# Patient Record
Sex: MALE | Race: WHITE | NOT HISPANIC OR LATINO | URBAN - METROPOLITAN AREA
[De-identification: names, ages, dates, MRNs, and addresses within clinical notes are randomized per-mention and may not be internally consistent; named-entity substitution may affect disease eponyms.]

---

## 2021-02-13 DIAGNOSIS — Z23 ENCOUNTER FOR IMMUNIZATION: ICD-10-CM

## 2021-02-15 ENCOUNTER — IMMUNIZATIONS (OUTPATIENT)
Dept: FAMILY MEDICINE CLINIC | Facility: HOSPITAL | Age: 66
End: 2021-02-15

## 2021-02-15 DIAGNOSIS — Z23 ENCOUNTER FOR IMMUNIZATION: Primary | ICD-10-CM

## 2021-02-15 PROCEDURE — 0001A SARS-COV-2 / COVID-19 MRNA VACCINE (PFIZER-BIONTECH) 30 MCG: CPT | Performed by: PHYSICIAN ASSISTANT

## 2021-02-15 PROCEDURE — 91300 SARS-COV-2 / COVID-19 MRNA VACCINE (PFIZER-BIONTECH) 30 MCG: CPT | Performed by: PHYSICIAN ASSISTANT

## 2021-03-08 ENCOUNTER — IMMUNIZATIONS (OUTPATIENT)
Dept: FAMILY MEDICINE CLINIC | Facility: HOSPITAL | Age: 66
End: 2021-03-08

## 2021-03-08 DIAGNOSIS — Z23 ENCOUNTER FOR IMMUNIZATION: Primary | ICD-10-CM

## 2021-03-08 PROCEDURE — 91300 SARS-COV-2 / COVID-19 MRNA VACCINE (PFIZER-BIONTECH) 30 MCG: CPT

## 2021-03-08 PROCEDURE — 0002A SARS-COV-2 / COVID-19 MRNA VACCINE (PFIZER-BIONTECH) 30 MCG: CPT

## 2023-03-29 VITALS
WEIGHT: 187 LBS | HEART RATE: 71 BPM | SYSTOLIC BLOOD PRESSURE: 136 MMHG | DIASTOLIC BLOOD PRESSURE: 77 MMHG | BODY MASS INDEX: 30.05 KG/M2 | HEIGHT: 66 IN

## 2023-03-29 DIAGNOSIS — M25.511 ACUTE PAIN OF RIGHT SHOULDER DUE TO TRAUMA: Primary | ICD-10-CM

## 2023-03-29 DIAGNOSIS — G89.11 ACUTE PAIN OF RIGHT SHOULDER DUE TO TRAUMA: Primary | ICD-10-CM

## 2023-03-29 RX ORDER — VALSARTAN 80 MG/1
80 TABLET ORAL DAILY
COMMUNITY
Start: 2023-02-17 | End: 2024-02-17

## 2023-03-29 RX ORDER — AMPICILLIN TRIHYDRATE 250 MG
1000 CAPSULE ORAL DAILY
COMMUNITY

## 2023-03-29 RX ORDER — ROSUVASTATIN CALCIUM 20 MG/1
TABLET, COATED ORAL
COMMUNITY
Start: 2005-04-13

## 2023-03-29 RX ORDER — MILK THISTLE 150 MG
1 CAPSULE ORAL DAILY
COMMUNITY

## 2023-03-29 RX ORDER — ROSUVASTATIN CALCIUM 20 MG/1
TABLET, COATED ORAL
COMMUNITY
Start: 2023-02-06

## 2023-03-29 RX ORDER — LANOLIN ALCOHOL/MO/W.PET/CERES
1 CREAM (GRAM) TOPICAL 2 TIMES DAILY
COMMUNITY

## 2023-03-29 RX ORDER — METOPROLOL SUCCINATE 50 MG/1
TABLET, EXTENDED RELEASE ORAL
COMMUNITY
Start: 2005-04-13

## 2023-03-29 NOTE — PROGRESS NOTES
"Orthopaedic Surgery - Office Note  Manuel Dow (89 y o  male)   : 1955   MRN: [id number]  Encounter Date: 3/29/2023    Chief Complaint   Patient presents with   • Right Shoulder - Pain       Assessment / Plan  Acute right shoulder pain- rotator cuff injury suspected     · Activity as tolerated  · Begin outpatient PT for rotator cuff strengthening  · Anti-inflammatories or Tylenol prn pain   · If patient does not see success with conservative treatments a MRI of the right shoulder can be obtained to further evaluate the integrity of the rotator cuff muscle  Return in about 6 weeks (around 5/10/2023) for w/ Dr Sravan Navarrete  History of Present Illness  Manuel Dow is a 69 y o  male who presents for an evaluation of the right shoulder  The pain began 7 month(s) ago and is associated with an acute injury  Patient was throwing a ball to his dog and felt immediate pain anterior shoulder with radiating pain down his bicep  The patient describes the pain as aching and sharp in intensity,  intermittent in timing, and localizes the pain to the  right subacromial joint  The pain is worse with overhead work, raising arm over head and reaching behind his back and lifting away from his body  and relieved by rest, avoiding the painful activities  The pain is not associated with numbness and tingling  The patient is awoken at night by the pain  The patient has not had any previous treatment for the right shoulder    Review of Systems  Pertinent items are noted in HPI  All other systems were reviewed and are negative  Physical Exam  /77 (BP Location: Left arm, Patient Position: Sitting, Cuff Size: Adult)   Pulse 71   Ht 5' 6\" (1 676 m) Comment: verbal  Wt 84 8 kg (187 lb)   BMI 30 18 kg/m²   Cons: Appears well  No apparent distress  Psych: Alert  Oriented x3  Mood and affect normal   Eyes: PERRLA, EOMI  Resp: Normal effort  No audible wheezing or stridor  CV: Palpable pulse    No discernable " arrhythmia  No LE edema  Lymph:  No palpable cervical, axillary, or inguinal lymphadenopathy  Skin: Warm  No palpable masses  No visible lesions  Neuro: Normal muscle tone  Normal and symmetric DTR's  Right Shoulder Exam  Alignment / Posture:  Normal shoulder posture  Inspection:  No swelling  No edema  No erythema  No ecchymosis  Palpation:  No tenderness  ROM:  Shoulder   Shoulder ER 60  Shoulder IR T8  Strength:  Subscapularis 5/5  Elevation 5/5, external -4/5   Stability:  No objective shoulder instability  Tests: (+) Walton  (+) Janas Smoke  Neurovascular:  Sensation intact in Ax/R/M/U nerve distributions  2+ radial pulse  Studies Reviewed  No studies to review    Procedures  No procedures today  Medical, Surgical, Family, and Social History  The patient's medical history, family history, and social history, were reviewed and updated as appropriate  History reviewed  No pertinent past medical history  History reviewed  No pertinent surgical history  History reviewed  No pertinent family history      Social History     Occupational History   • Not on file   Tobacco Use   • Smoking status: Never   • Smokeless tobacco: Never   Substance and Sexual Activity   • Alcohol use: Not on file   • Drug use: Not on file   • Sexual activity: Not on file       Not on File      Current Outpatient Medications:   •  Cholecalciferol 50 MCG (2000 UT) CAPS, Take 5,000 capsules by mouth, Disp: , Rfl:   •  Cinnamon 500 MG capsule, Take 1,000 mg by mouth daily, Disp: , Rfl:   •  glucosamine-chondroitin 500-400 MG tablet, Take 1 tablet by mouth 2 (two) times a day As Needed, Disp: , Rfl:   •  metoprolol succinate (TOPROL-XL) 50 mg 24 hr tablet, , Disp: , Rfl:   •  Milk Thistle 150 MG CAPS, Take 1 tablet by mouth daily, Disp: , Rfl:   •  rosuvastatin (CRESTOR) 20 MG tablet, , Disp: , Rfl:   •  Turmeric 1053 MG TABS, Use, Disp: , Rfl:   •  valsartan (DIOVAN) 80 mg tablet, Take 80 mg by mouth daily, Disp: , Rfl:   •  rosuvastatin (CRESTOR) 20 MG tablet, TAKE ONE TABLET BY MOUTH EVERY DAY - PATIENT NEEDS FOLLOW-UP WITH LABS, Disp: , Rfl:       Farhat Azul    Scribe Attestation    I,:  Farhat Azul am acting as a scribe while in the presence of the attending physician :       I,:  Shankar Ramachandran MD personally performed the services described in this documentation    as scribed in my presence :

## 2023-04-03 ENCOUNTER — EVALUATION (OUTPATIENT)
Dept: PHYSICAL THERAPY | Facility: CLINIC | Age: 68
End: 2023-04-03

## 2023-04-03 DIAGNOSIS — M25.511 ACUTE PAIN OF RIGHT SHOULDER DUE TO TRAUMA: Primary | ICD-10-CM

## 2023-04-03 DIAGNOSIS — G89.11 ACUTE PAIN OF RIGHT SHOULDER DUE TO TRAUMA: Primary | ICD-10-CM

## 2023-04-03 NOTE — PROGRESS NOTES
PT EVALUATION    Today's date: 23  Patient name: Clemente Salinas  : 1955  MRN: [id number]  Referring provider: Chapito Aguilera, *  Dx:   1  Acute pain of right shoulder due to trauma        Clemente Salinas is a 69 y o  male who presents with right shoulder pain following a throwing movement >6 months ago  Symptoms tend to be anterior and down the biceps  There is tightness with internal rotation and pain with resisted ER rotation  This patient would benefit from skilled physical therapy to address their listed impairments and functional limitations to maximize functional outcome  Impairments:    restricted ROM    decreased strength   pain with function   activity intolerance   Postural dysfunction     Prognosis:  Excellent  Positive and negative prognostic indicator(s):  pain >3 months    Goals:    STG Patient is independent with HEP   STG Range of motion is improved by 25% in 2 weeks  LTG Range of motion is improved by 50% in 4 weeks  LTG Increase strength full grade in 4 weeks     Planned interventions:  home exercise program, patient education, manual therapy, graded activity, flexibility and functional range of motion exercises    Duration in visits:  8  Frequency: 2 visits per week  Duration in weeks:  4-6    History of Current Injury:  6+ months ago, he had onset right shoulder pain about an hour after throwing a ball to his dog  Symptoms were anterior and into the biceps  He wakes at night due to symptoms depending on his arm position (demonstrates behind the head)  Denies prior history of left shoulder injury  He doesn't usually have to lift at work but had increased pain when working overhead using a drill recently        Pain location: anterior shoulder  Pain descriptors:  sharp  Pain intensity:  7/10, 9/08 at rest in certain positions (arm supported)    Aggravating factors: overhead activities, reaching behind the back, lifting away from body  Easing factors: avoiding above      Patient goals:  decreased pain, increased mobility and increased strength    Objective     Active Range of Motion   Left Shoulder   Flexion: 162 degrees   Extension: 50 degrees   Abduction: 162 degrees   Internal rotation BTB: T10     Right Shoulder   Flexion: 158 degrees   Extension: 50 degrees   Abduction: 159 degrees   Internal rotation BTB: T10     Strength/Myotome Testing     Left Shoulder     Planes of Motion   Flexion: 4+   Extension: 4   External rotation at 0°: 4   Internal rotation at 0°: 5     Additional Strength Details  Pain with resisted ER    General Comments:      Shoulder Comments   Type 1 SICK scapula R>L  Protracted scapulae          Precautions: none      Manuals 4/3            Right scap ROM SY            IR stretching SY            pec minor release                          Neuro Re-Ed                                                                                                        Ther Ex             Thoracic ext over chair :10x5            Doorway uni  Stretch? Avoid ant pain :10x5            rows Red TB :10x5            ext             ER Red TB :10x5            Cross body stretch against wall :10x5            Sleeper stretch?             pball roll up wall?              Progressive RTC strengthening                                                                 Ther Activity                                       Gait Training                                       Modalities

## 2023-04-11 ENCOUNTER — APPOINTMENT (OUTPATIENT)
Dept: PHYSICAL THERAPY | Facility: CLINIC | Age: 68
End: 2023-04-11

## 2023-04-17 ENCOUNTER — APPOINTMENT (OUTPATIENT)
Dept: PHYSICAL THERAPY | Facility: CLINIC | Age: 68
End: 2023-04-17

## 2023-05-01 ENCOUNTER — OFFICE VISIT (OUTPATIENT)
Dept: PHYSICAL THERAPY | Facility: CLINIC | Age: 68
End: 2023-05-01

## 2023-05-01 DIAGNOSIS — M25.511 ACUTE PAIN OF RIGHT SHOULDER DUE TO TRAUMA: Primary | ICD-10-CM

## 2023-05-01 DIAGNOSIS — G89.11 ACUTE PAIN OF RIGHT SHOULDER DUE TO TRAUMA: Primary | ICD-10-CM

## 2023-05-01 NOTE — PROGRESS NOTES
Daily Note     Today's date: 2023  Patient name: Liliane Morrison  : 1955  MRN: [id number]  Referring provider: Justin Holliday, *  Dx:   Encounter Diagnosis     ICD-10-CM    1  Acute pain of right shoulder due to trauma  M25 511     G89 11                      Subjective: notes he might be getting a little better but he continues to have increased pain with lifting heavier objects, which he has a hard time avoiding      Objective: See treatment diary below      Assessment: Tolerated treatment well  Patient exhibited good technique with therapeutic exercises and would benefit from continued PT  Reviewed form on band exercises, looking for increased scapular retraction       Plan: Progress treatment as tolerated  Follow-up with Dr Ekta Hankins on 5/10/23     Precautions: none      Manuals 4/3 4/17 5/1          Right scap ROM SY SY SY          IR stretching SY SY SY          pec minor release                          Neuro Re-Ed                                                                                                        Ther Ex             Thoracic ext over chair :10x5 :10x5 :10x5          Doorway uni  Stretch? Avoid ant pain :10x5 :10x5 :10x5          rows Red TB :10x5 Red TB :05x15 Green TB :05x20          ext             ER Red TB :10x5 Red TB :05x15 Red TB :05x20          Cross body stretch against wall :10x5 D/c           Sleeper stretch?  :10x5 sidelying :10x5 sidelying          pball roll up wall?   Wall slides 15           Progressive RTC strengthening             TB ext  Green :05x15 Green :05x20          scap press downs  :05x10 :05x15                                    Ther Activity                                       Gait Training                                       Modalities

## 2023-05-10 VITALS
DIASTOLIC BLOOD PRESSURE: 88 MMHG | BODY MASS INDEX: 30.05 KG/M2 | WEIGHT: 187 LBS | SYSTOLIC BLOOD PRESSURE: 155 MMHG | HEIGHT: 66 IN | HEART RATE: 65 BPM

## 2023-05-10 DIAGNOSIS — M25.511 ACUTE PAIN OF RIGHT SHOULDER DUE TO TRAUMA: Primary | ICD-10-CM

## 2023-05-10 DIAGNOSIS — G89.11 ACUTE PAIN OF RIGHT SHOULDER DUE TO TRAUMA: Primary | ICD-10-CM

## 2023-05-10 RX ORDER — ASPIRIN 81 MG/1
81 TABLET ORAL DAILY
COMMUNITY
Start: 2023-04-27 | End: 2024-04-26

## 2023-05-10 NOTE — PROGRESS NOTES
"Orthopaedic Surgery - Office Note  Samuel Blas (73 y o  male)   : 1955   MRN: [id number]  Encounter Date: 5/10/2023    Chief Complaint   Patient presents with   • Right Shoulder - Follow-up       Assessment / Plan  Acute right shoulder pain- rotator cuff injury suspected     · Patient is electing to continue with PT, new referral given today   · If he fails to improve, we will order an MRI to further evaluate the integrity of the rotator cuff   · Activity as tolerated  · Reviewed recent PT notes  · Ice, heat and anti-inflammatories prn   Return in about 2 months (around 7/10/2023) for follow up w/ Dr Destiny Mercado  History of Present Illness  Samuel Blas is a 69 y o  male who presents for follow up Acute right shoulder pain- rotator cuff injury suspected  The pain began 2022 and is associated with an acute injury  Patient was throwing a ball to his dog and felt immediate pain anterior shoulder with radiating pain down his bicep  Since his prior visit, he has been attending PT which he feels has significantly helped reduce his symptoms  However, he notes continues pain with heavy overhead lifting  He is using his arm as tolerated  He states his pain at night has resolved  He denies any radiating symptoms  Review of Systems  Pertinent items are noted in HPI  All other systems were reviewed and are negative  Physical Exam  /88   Pulse 65   Ht 5' 6\" (1 676 m)   Wt 84 8 kg (187 lb)   BMI 30 18 kg/m²   Cons: Appears well  No apparent distress  Psych: Alert  Oriented x3  Mood and affect normal   Eyes: PERRLA, EOMI  Resp: Normal effort  No audible wheezing or stridor  CV: Palpable pulse  No discernable arrhythmia  No LE edema  Lymph:  No palpable cervical, axillary, or inguinal lymphadenopathy  Skin: Warm  No palpable masses  No visible lesions  Neuro: Normal muscle tone  Normal and symmetric DTR's       Right Shoulder Exam  Alignment / Posture:  Normal shoulder " posture  Inspection:  No swelling  No ecchymosis  Palpation:  No tenderness  No effusion  ROM:  Shoulder   Shoulder ER 60  Shoulder IR T8  Strength:  Supraspinatus 5/5  Infraspinatus 4+/5  Subscapularis 5/5  Stability:  No objective shoulder instability  Tests: (+) Walton  Neurovascular:  Sensation intact in Ax/R/M/U nerve distributions  2+ radial pulse  Studies Reviewed  No studies to review    Procedures  No procedures today  Medical, Surgical, Family, and Social History  The patient's medical history, family history, and social history, were reviewed and updated as appropriate  History reviewed  No pertinent past medical history  History reviewed  No pertinent surgical history  History reviewed  No pertinent family history      Social History     Occupational History   • Not on file   Tobacco Use   • Smoking status: Never   • Smokeless tobacco: Never   Substance and Sexual Activity   • Alcohol use: Not on file   • Drug use: Not on file   • Sexual activity: Not on file       No Known Allergies      Current Outpatient Medications:   •  aspirin (ECOTRIN LOW STRENGTH) 81 mg EC tablet, Take 81 mg by mouth daily, Disp: , Rfl:   •  Cholecalciferol 50 MCG (2000 UT) CAPS, Take 5,000 capsules by mouth, Disp: , Rfl:   •  Cinnamon 500 MG capsule, Take 1,000 mg by mouth daily, Disp: , Rfl:   •  glucosamine-chondroitin 500-400 MG tablet, Take 1 tablet by mouth 2 (two) times a day As Needed, Disp: , Rfl:   •  metoprolol succinate (TOPROL-XL) 50 mg 24 hr tablet, , Disp: , Rfl:   •  Milk Thistle 150 MG CAPS, Take 1 tablet by mouth daily, Disp: , Rfl:   •  rosuvastatin (CRESTOR) 20 MG tablet, TAKE ONE TABLET BY MOUTH EVERY DAY - PATIENT NEEDS FOLLOW-UP WITH LABS, Disp: , Rfl:   •  Turmeric 1053 MG TABS, Use, Disp: , Rfl:   •  valsartan (DIOVAN) 80 mg tablet, Take 80 mg by mouth daily, Disp: , Rfl:   •  rosuvastatin (CRESTOR) 20 MG tablet, , Disp: , Rfl:       [employer]    I,: am acting as a scribe while in the presence of the attending physician :       I,:   personally performed the services described in this documentation    as scribed in my presence :

## 2023-05-15 ENCOUNTER — APPOINTMENT (OUTPATIENT)
Dept: PHYSICAL THERAPY | Facility: CLINIC | Age: 68
End: 2023-05-15
Payer: MEDICARE

## 2023-06-19 VITALS
DIASTOLIC BLOOD PRESSURE: 80 MMHG | HEIGHT: 66 IN | SYSTOLIC BLOOD PRESSURE: 136 MMHG | HEART RATE: 80 BPM | BODY MASS INDEX: 30.05 KG/M2 | WEIGHT: 187 LBS

## 2023-06-19 DIAGNOSIS — M54.42 ACUTE LEFT-SIDED LOW BACK PAIN WITH LEFT-SIDED SCIATICA: Primary | ICD-10-CM

## 2023-06-19 DIAGNOSIS — M47.816 FACET ARTHROPATHY, LUMBAR: ICD-10-CM

## 2023-06-19 DIAGNOSIS — M51.36 LUMBAR DEGENERATIVE DISC DISEASE: ICD-10-CM

## 2023-06-19 DIAGNOSIS — M47.816 LUMBAR SPONDYLOSIS: ICD-10-CM

## 2023-06-19 PROCEDURE — 99214 OFFICE O/P EST MOD 30 MIN: CPT | Performed by: EMERGENCY MEDICINE

## 2023-06-19 NOTE — PROGRESS NOTES
Assessment/Plan:    Diagnoses and all orders for this visit:    Acute left-sided low back pain with left-sided sciatica  -     XR spine lumbar 2 or 3 views injury; Future  -     XR hip/pelv 2-3 vws left if performed; Future  -     Ambulatory Referral to Physical Therapy; Future    Lumbar degenerative disc disease  -     XR spine lumbar 2 or 3 views injury; Future  -     XR hip/pelv 2-3 vws left if performed; Future  -     Ambulatory Referral to Physical Therapy; Future    Facet arthropathy, lumbar  -     Ambulatory Referral to Physical Therapy; Future    Lumbar spondylosis  -     Ambulatory Referral to Physical Therapy; Future    May continue NSAIDs PRN  Recommend PT and/or continue home stretches  Xrays reviewed    Return in about 6 weeks (around 7/31/2023)  Subjective:   Patient ID: Park Hooks is a 69 y o  male  NP presents for LEFT LBP x 1 week occurring abruptly getting up from [facility] chair  He has been stretching and using heating pad, with symptoms progressively improving  Denies weakness of legs but notes mild n/t left thigh but this is a chronic issue since vascular surgery  Was taking IBU 400mg q6 hours for a few days  Hx of chronic intermittent RIGHT LBP/Hip pain   Will be driving on vacation      Review of Systems    The following portions of the patient's chart were reviewed and updated as appropriate:    Allergy:  No Known Allergies    Medications:    Current Outpatient Medications:   •  aspirin (ECOTRIN LOW STRENGTH) 81 mg EC tablet, Take 81 mg by mouth daily, Disp: , Rfl:   •  Cholecalciferol 50 MCG (2000 UT) CAPS, Take 5,000 capsules by mouth, Disp: , Rfl:   •  Cinnamon 500 MG capsule, Take 1,000 mg by mouth daily, Disp: , Rfl:   •  glucosamine-chondroitin 500-400 MG tablet, Take 1 tablet by mouth 2 (two) times a day As Needed, Disp: , Rfl:   •  metoprolol succinate (TOPROL-XL) 50 mg 24 hr tablet, , Disp: , Rfl:   •  Milk Thistle 150 MG CAPS, Take 1 tablet by mouth daily, "Disp: , Rfl:   •  rosuvastatin (CRESTOR) 20 MG tablet, TAKE ONE TABLET BY MOUTH EVERY DAY - PATIENT NEEDS FOLLOW-UP WITH LABS, Disp: , Rfl:   •  Turmeric 1053 MG TABS, Use, Disp: , Rfl:   •  valsartan (DIOVAN) 80 mg tablet, Take 80 mg by mouth daily, Disp: , Rfl:   •  rosuvastatin (CRESTOR) 20 MG tablet, , Disp: , Rfl:     There is no problem list on file for this patient  Objective:  /80   Pulse 80   Ht 5' 6\" (1 676 m)   Wt 84 8 kg (187 lb)   BMI 30 18 kg/m²     Back Exam     Muscle Strength   Right Quadriceps:  5/5   Left Quadriceps:  5/5     Tests   Straight leg raise right: negative  Straight leg raise left: negative    Reflexes   Patellar: normal    Other   Toe walk: normal  Heel walk: normal  Gait: antalgic     Comments:  No pain with Left hip PROM            Physical Exam  Musculoskeletal:      Lumbar back: Negative right straight leg raise test and negative left straight leg raise test            Neurologic Exam     Motor Exam     Strength   Right quadriceps: 5/5  Left quadriceps: 5/5      Procedures    I have personally reviewed pertinent films in PACS and my interpretation is Xrays Left hip and L spine: Diffuse degenerative changes of the lumbar spine and left hip, with bridging osteophytes and facet arthrosis  No evidence of stress or compression fracture            History reviewed  No pertinent past medical history  History reviewed  No pertinent surgical history      Social History     Socioeconomic History   • Marital status: /Civil Union     Spouse name: Not on file   • Number of children: Not on file   • Years of education: Not on file   • Highest education level: Not on file   Occupational History   • Not on file   Tobacco Use   • Smoking status: Never   • Smokeless tobacco: Never   Substance and Sexual Activity   • Alcohol use: Not on file   • Drug use: Not on file   • Sexual activity: Not on file   Other Topics Concern   • Not on file   Social History Narrative   • Not on " file     Social Determinants of Health     Financial Resource Strain: Not on file   Food Insecurity: Not on file   Transportation Needs: Not on file   Physical Activity: Not on file   Stress: Not on file   Social Connections: Not on file   Intimate Partner Violence: Not on file   Housing Stability: Not on file       History reviewed  No pertinent family history

## 2023-06-19 NOTE — PATIENT INSTRUCTIONS
You may use Advil (ibuprofen) 600mg every 6 hours or at least twice per day OR Aleve (naproxen) 250-500mg every 12 hours as needed for pain and inflammation  You may also take Tylenol 500mg every 4-6 hours as needed OR max 1,000mg per dose up to 3 times per day for a total of 3,000mg per day  Check with your primary care physician to see if these medications are safe to take and to make sure they do not interfere with your other medications and medical issues  Back Pain   WHAT YOU NEED TO KNOW:   Back pain is common  You may have back pain and muscle spasms  You may feel sore or stiff on one or both sides of your back  The pain may spread to your lower body  Conditions that affect the spine, joints, or muscles can cause back pain  These may include arthritis, spinal stenosis (narrowing of the spinal column), muscle tension, or breakdown of the spinal discs  DISCHARGE INSTRUCTIONS:   Call your local emergency number (911 in the [address] [place] [address],[address]) if:   You have severe back pain with chest pain  You cannot control your urine or bowel movements  Your pain becomes so severe that you cannot walk  Return to the emergency department if:   You have pain, numbness, or weakness in one or both legs  You have severe back pain, nausea, and vomiting  You have severe back pain that spreads to your side or genital area  Call your doctor if:   You have back pain that does not get better with rest and pain medicine  You have a fever  You have pain that worsens when you are on your back or when you rest     You have pain that worsens when you cough or sneeze  You lose weight without trying  You have questions or concerns about your condition or care  Medicines: You may need any of the following:  NSAIDs  help decrease swelling and pain or fever  This medicine is available with or without a doctor's order  NSAIDs can cause stomach bleeding or kidney problems in certain people   If you take blood thinner medicine, always ask your healthcare provider if NSAIDs are safe for you  Always read the medicine label and follow directions  Acetaminophen  decreases pain and fever  It is available without a doctor's order  Ask how much to take and how often to take it  Follow directions  Read the labels of all other medicines you are using to see if they also contain acetaminophen, or ask your doctor or pharmacist  Acetaminophen can cause liver damage if not taken correctly  Muscle relaxers  help decrease muscle spasms and back pain  Prescription pain medicine  may be given  Ask your healthcare provider how to take this medicine safely  Some prescription pain medicines contain acetaminophen  Do not take other medicines that contain acetaminophen without talking to your healthcare provider  Too much acetaminophen may cause liver damage  Prescription pain medicine may cause constipation  Ask your healthcare provider how to prevent or treat constipation  Take your medicine as directed  Contact your healthcare provider if you think your medicine is not helping or if you have side effects  Tell your provider if you are allergic to any medicine  Keep a list of the medicines, vitamins, and herbs you take  Include the amounts, and when and why you take them  Bring the list or the pill bottles to follow-up visits  Carry your medicine list with you in case of an emergency  How to manage your back pain:   Apply ice  on your back for 15 to 20 minutes every hour or as directed  Use an ice pack, or put crushed ice in a plastic bag  Cover it with a towel before you apply it to your skin  Ice helps prevent tissue damage and decreases pain  Apply heat  on your back for 20 to 30 minutes every 2 hours for as many days as directed  Heat helps decrease pain and muscle spasms  Stay active  as much as you can without causing more pain  Bed rest could make your back pain worse  Avoid heavy lifting until your pain is gone  Go to physical therapy  as directed  A physical therapist can teach you exercises to help improve movement and strength, and to decrease pain  Follow up with your doctor in 2 weeks, or as directed: You might need to see a specialist  Write down your questions so you remember to ask them during your visits  © Copyright [employer] 2022 Information is for End User's use only and may not be sold, redistributed or otherwise used for commercial purposes  The above information is an  only  It is not intended as medical advice for individual conditions or treatments  Talk to your doctor, nurse or pharmacist before following any medical regimen to see if it is safe and effective for you

## 2023-06-21 DIAGNOSIS — M54.42 ACUTE LEFT-SIDED LOW BACK PAIN WITH LEFT-SIDED SCIATICA: Primary | ICD-10-CM

## 2023-06-21 DIAGNOSIS — M47.816 FACET ARTHROPATHY, LUMBAR: ICD-10-CM

## 2023-06-21 DIAGNOSIS — M51.36 LUMBAR DEGENERATIVE DISC DISEASE: ICD-10-CM

## 2023-06-21 DIAGNOSIS — M47.816 LUMBAR SPONDYLOSIS: ICD-10-CM

## 2023-06-21 NOTE — TELEPHONE ENCOUNTER
Caller: Patient    Doctor: Julia Romo    Reason for call: Pt is requesting a referral to go to pain management  Please give pt a call when referral is placed      Call back#: [phone]

## 2023-06-26 ENCOUNTER — EVALUATION (OUTPATIENT)
Dept: PHYSICAL THERAPY | Facility: CLINIC | Age: 68
End: 2023-06-26
Payer: MEDICARE

## 2023-06-26 DIAGNOSIS — M51.36 LUMBAR DEGENERATIVE DISC DISEASE: ICD-10-CM

## 2023-06-26 DIAGNOSIS — M47.816 FACET ARTHROPATHY, LUMBAR: ICD-10-CM

## 2023-06-26 DIAGNOSIS — M54.42 ACUTE LEFT-SIDED LOW BACK PAIN WITH LEFT-SIDED SCIATICA: ICD-10-CM

## 2023-06-26 DIAGNOSIS — M47.816 LUMBAR SPONDYLOSIS: ICD-10-CM

## 2023-06-26 PROCEDURE — 97161 PT EVAL LOW COMPLEX 20 MIN: CPT | Performed by: PHYSICAL THERAPIST

## 2023-06-26 PROCEDURE — 97110 THERAPEUTIC EXERCISES: CPT | Performed by: PHYSICAL THERAPIST

## 2023-06-26 NOTE — PROGRESS NOTES
PT Evaluation     Today's date: 2023  Patient name: Park Hooks  : 1955  MRN: [id number]  Referring provider: Jose Young, *  Dx:   Encounter Diagnosis     ICD-10-CM    1  Lumbar degenerative disc disease  M51 [id number] Ambulatory Referral to Physical Therapy      2  Acute left-sided low back pain with left-sided sciatica  M54 42 Ambulatory Referral to Physical Therapy      3  Facet arthropathy, lumbar  M47 [id number] Ambulatory Referral to Physical Therapy      4  Lumbar spondylosis  M47 [id number] Ambulatory Referral to Physical Therapy                     Assessment  Assessment details: Park Hooks is a 69 y o  male who presents with signs and symptoms consistent of improving acute LBP  Patient presents with decreased lateral flexion and flexion ROM as well as high fear with returning to desired levels of activity  Patient would benefit from skilled physical therapy to address the impairments, improve their level of function, and to improve their overall quality of life      Impairments: abnormal muscle firing, abnormal or restricted ROM, activity intolerance, pain with function and poor body mechanics  Understanding of Dx/Px/POC: good   Prognosis: good  Prognosis details: Positive prognostic indicators include: absence of observed red flags, positive attitude towards recovery, good understanding of diagnosis and treatment plan   Negative prognostic indicators include: none     Goals  STG: To be achieved in 4 -6 weeks  1)Improve lumbar spine ROM by 25%   2)Reduce pain by 50%  3)Improve strength in lower extremity by 1/2 MMT    LTG: To be achieved at Discharge  1)Patient is independent with HEP  2)Return to pre-morbid work related activities  3)Improve tolerance to prolonged sitting, standing, and walking to prior level of function    Plan  Patient would benefit from: skilled physical therapy  Planned therapy interventions: manual therapy, neuromuscular re-education, patient education, therapeutic activities, therapeutic exercise and home exercise program  Frequency: 2x/week for 4-6 weeks  Treatment plan discussed with: patient        Subjective Evaluation    History of Present Illness  Mechanism of injury: History: Pt presents for left LBP that began two weeks ago occurring abruptly getting up from [facility] chair  He pretty much stayed in bed for a few days straight and took some medication which helped  The pain has progressively gotten better over the last few weeks  He goes on vacation in 10 days and wants to make sure he's feeling good  He generally feels pretty good unless he does quick movements  Has chronic numbness and tingling from left thigh but this is a chronic issue since vascular surgery  Aggravating: quick twisting/turning movements   Alleviating: moving   24 hours: morning stiffness- takes 15 minutes or so to loosen it up  Functional limitations: can't swim out of fear; hesitant to lift; golf   Imaging: Multilevel degenerative changes of lumbar spine       Pain  Current pain rating: 3  At worst pain ratin  Location: left low back and radiates to the side   Quality: sharp and dull ache    Patient Goals  Patient goals for therapy: decreased pain, independence with ADLs/IADLs and return to sport/leisure activities          Objective     Tenderness     Additional Tenderness Details  Minor tenderness noted at lateral hip on L, minor discomfort at paraspinals     Neurological Testing     Sensation     Lumbar   Left   Intact: light touch    Right   Intact: light touch    Active Range of Motion     Lumbar   Flexion:  Restriction level: minimal  Extension:  with pain Restriction level: moderate  Left lateral flexion:  Restriction level: minimal  Right lateral flexion:  with pain Restriction level: minimal  Left rotation:  Restriction level: minimal  Right rotation:  Restriction level: minimal    Strength/Myotome Testing     Left Hip   Planes of Motion   Flexion: 5  External rotation: 5  Internal rotation: 5    Right Hip   Planes of Motion   Flexion: 5  External rotation: 5  Internal rotation: 5    Left Knee   Flexion: 5  Extension: 5    Right Knee   Flexion: 5  Extension: 5    Left Ankle/Foot   Dorsiflexion: 5  Plantar flexion: 5    Right Ankle/Foot   Dorsiflexion: 5  Plantar flexion: 5    General Comments:      Lumbar Comments  Hip PROM - painfree; limited hip IR on the L     Supine <> sit <> stand transfers:              Precautions: none       Manuals 6/26                                                                Neuro Re-Ed             3 way hip              Clamshells                                                                               Ther Ex             LTR HEP            SKC HEP            Active warmup- bike              Bridges                                                                 Ther Activity             Sit to stand HEP             Lifting mechanics from various positions                                                    Pt Edu                                        Modalities

## 2023-07-10 ENCOUNTER — TELEPHONE (OUTPATIENT)
Dept: OBGYN CLINIC | Facility: HOSPITAL | Age: 68
End: 2023-07-10

## 2023-07-10 DIAGNOSIS — M54.42 ACUTE LEFT-SIDED LOW BACK PAIN WITH LEFT-SIDED SCIATICA: Primary | ICD-10-CM

## 2023-07-10 RX ORDER — METHYLPREDNISOLONE 4 MG/1
TABLET ORAL
Qty: 21 TABLET | Refills: 0 | Status: SHIPPED | OUTPATIENT
Start: 2023-07-10

## 2023-07-10 NOTE — TELEPHONE ENCOUNTER
Called Pt per Dr. Ami Richards to let him know that he would be calling in a Medrol dose pack for him to take.   Also, informed Pt that he should hold all NSAID'S while taking Medrol dose pack

## 2023-07-10 NOTE — TELEPHONE ENCOUNTER
Caller: Clotilde Quintero    Doctor: Musa Stephenson    Reason for call: Patient calling to report he turned the wrong way while trying to get out of bed and has been experiencing increased pain. Per patient he is taking advil every 2 hrs with no relief. Patient states he is away on vacation and was hoping you could call something into the local pharmacy for him to help ease his pain?   Please advise    Call back#: [phone]

## 2023-07-24 ENCOUNTER — OFFICE VISIT (OUTPATIENT)
Dept: OBGYN CLINIC | Facility: CLINIC | Age: 68
End: 2023-07-24
Payer: MEDICARE

## 2023-07-24 VITALS — HEIGHT: 66 IN | WEIGHT: 187 LBS | BODY MASS INDEX: 30.05 KG/M2

## 2023-07-24 DIAGNOSIS — M47.816 FACET ARTHROPATHY, LUMBAR: ICD-10-CM

## 2023-07-24 DIAGNOSIS — M54.42 ACUTE LEFT-SIDED LOW BACK PAIN WITH LEFT-SIDED SCIATICA: Primary | ICD-10-CM

## 2023-07-24 DIAGNOSIS — M47.816 LUMBAR SPONDYLOSIS: ICD-10-CM

## 2023-07-24 DIAGNOSIS — M51.36 LUMBAR DEGENERATIVE DISC DISEASE: ICD-10-CM

## 2023-07-24 PROCEDURE — 99214 OFFICE O/P EST MOD 30 MIN: CPT | Performed by: EMERGENCY MEDICINE

## 2023-07-24 NOTE — PROGRESS NOTES
Assessment/Plan:    Diagnoses and all orders for this visit:    Acute left-sided low back pain with left-sided sciatica  -     MRI lumbar spine wo contrast; Future    Lumbar degenerative disc disease  -     MRI lumbar spine wo contrast; Future    Facet arthropathy, lumbar  -     MRI lumbar spine wo contrast; Future    Lumbar spondylosis  -     MRI lumbar spine wo contrast; Future    MRI of the lumbar spine to evaluate for left-sided upper lumbar nerve impingement causing radicular pain to the left hip. X-rays were previously obtained and reviewed again today. His hip exam has been benign. If the pain returns or increases he may take the Medrol Dosepak as discussed previously. Patient wishes to hold off on referral to pain management at this point in time      Total time:  45 min  Time patient roomed:11:08  Time patient checked out: 11:55  This inicludes time spent with the patient during the visit as well as time spent before and after the visit reviewing the chart, performing PE, discussing etiologies and treatment, documenting the encounter, making phone calls, reviewing studies, etc.      Return for Follow Up After Imaging Study. Subjective:   Patient ID: Celia Almendarez is a 69 y.o. male. HPI    Patient returns did NOT take Medrol dose pack as he was feeling improved. Notes continued LEFT lateral hip pain which was severe on first day of his vacation but today is significant improved. He did have the initial PT evaluation and was taught home exercises. He notes that this pain generally returns due to twisting motions  He is concerned about his symptoms as they are increasing in frequency and severity. Initial note:   NP presents for LEFT LBP x 1 week occurring abruptly getting up from [facility] chair.     He has been stretching and using heating pad, with symptoms progressively improving  Denies weakness of legs but notes mild n/t left thigh but this is a chronic issue since vascular surgery. Was taking IBU 400mg q6 hours for a few days  Hx of chronic intermittent RIGHT LBP/Hip pain   Will be driving on vacation    Review of Systems    The following portions of the patient's chart were reviewed and updated as appropriate: Allergy:  No Known Allergies    Medications:    Current Outpatient Medications:   •  aspirin (ECOTRIN LOW STRENGTH) 81 mg EC tablet, Take 81 mg by mouth daily, Disp: , Rfl:   •  Cholecalciferol 50 MCG (2000 UT) CAPS, Take 5,000 capsules by mouth, Disp: , Rfl:   •  Cinnamon 500 MG capsule, Take 1,000 mg by mouth daily, Disp: , Rfl:   •  glucosamine-chondroitin 500-400 MG tablet, Take 1 tablet by mouth 2 (two) times a day As Needed, Disp: , Rfl:   •  metoprolol succinate (TOPROL-XL) 50 mg 24 hr tablet, , Disp: , Rfl:   •  Milk Thistle 150 MG CAPS, Take 1 tablet by mouth daily, Disp: , Rfl:   •  rosuvastatin (CRESTOR) 20 MG tablet, TAKE ONE TABLET BY MOUTH EVERY DAY - PATIENT NEEDS FOLLOW-UP WITH LABS, Disp: , Rfl:   •  Turmeric 1053 MG TABS, Use, Disp: , Rfl:   •  valsartan (DIOVAN) 80 mg tablet, Take 80 mg by mouth daily, Disp: , Rfl:   •  methylprednisolone (MEDROL) 4 mg tablet, Medrol dose pack, take as directed, Disp: 21 tablet, Rfl: 0  •  methylprednisolone (MEDROL) 4 mg tablet, Medrol dose pack, take as directed, Disp: 21 tablet, Rfl: 0  •  rosuvastatin (CRESTOR) 20 MG tablet, , Disp: , Rfl:     There is no problem list on file for this patient.       Objective:  Ht 5' 6" (1.676 m)   Wt 84.8 kg (187 lb)   BMI 30.18 kg/m²     Back Exam     Muscle Strength   Right Quadriceps:  5/5   Left Quadriceps:  5/5     Tests   Straight leg raise right: negative  Straight leg raise left: negative    Reflexes   Patellar: normal    Other   Toe walk: normal  Heel walk: normal  Gait: antalgic     Comments:  No pain with Left hip PROM            Physical Exam  Musculoskeletal:      Lumbar back: Negative right straight leg raise test and negative left straight leg raise test. Neurologic Exam     Motor Exam     Strength   Right quadriceps: 5/5  Left quadriceps: 5/5      Procedures    I have personally reviewed the written report of the pertinent studies. History reviewed. No pertinent past medical history. History reviewed. No pertinent surgical history. Social History     Socioeconomic History   • Marital status: /Civil Union     Spouse name: Not on file   • Number of children: Not on file   • Years of education: Not on file   • Highest education level: Not on file   Occupational History   • Not on file   Tobacco Use   • Smoking status: Never   • Smokeless tobacco: Never   Substance and Sexual Activity   • Alcohol use: Not on file   • Drug use: Not on file   • Sexual activity: Not on file   Other Topics Concern   • Not on file   Social History Narrative   • Not on file     Social Determinants of Health     Financial Resource Strain: Not on file   Food Insecurity: Not on file   Transportation Needs: Not on file   Physical Activity: Not on file   Stress: Not on file   Social Connections: Not on file   Intimate Partner Violence: Not on file   Housing Stability: Not on file       History reviewed. No pertinent family history.

## 2023-07-24 NOTE — PATIENT INSTRUCTIONS
While taking the oral steroid Medrol Dose Pack, do not take any NSAIDs such as Advil, Motrin, ibuprofen, Motrin, Aleve, naproxen, Celebrex or Meloxicam.  You can restart the NSAIDs after you finish the steroids. However you may take Tylenol 500mg every 4-6 hours as needed OR max 1,000mg per dose up to 3 times per day for a total of 3,000mg per day  While taking oral steroids, you may experience mild side effects such as feeling jittery or flushing. Please call if your side effects are significant or you have any questions.

## 2023-08-14 ENCOUNTER — HOSPITAL ENCOUNTER (OUTPATIENT)
Dept: RADIOLOGY | Age: 68
Discharge: HOME | End: 2023-08-14
Payer: MEDICARE

## 2023-08-14 DIAGNOSIS — M47.816 FACET ARTHROPATHY, LUMBAR: ICD-10-CM

## 2023-08-14 DIAGNOSIS — M54.42 ACUTE LEFT-SIDED LOW BACK PAIN WITH LEFT-SIDED SCIATICA: ICD-10-CM

## 2023-08-14 DIAGNOSIS — M47.816 LUMBAR SPONDYLOSIS: ICD-10-CM

## 2023-08-14 DIAGNOSIS — M51.36 LUMBAR DEGENERATIVE DISC DISEASE: ICD-10-CM

## 2023-08-14 PROCEDURE — G1004 CDSM NDSC: HCPCS

## 2023-08-14 PROCEDURE — 72148 MRI LUMBAR SPINE W/O DYE: CPT

## 2023-08-16 ENCOUNTER — CONSULT (OUTPATIENT)
Dept: PAIN MEDICINE | Facility: CLINIC | Age: 68
End: 2023-08-16
Payer: MEDICARE

## 2023-08-16 VITALS
DIASTOLIC BLOOD PRESSURE: 88 MMHG | HEART RATE: 53 BPM | WEIGHT: 187 LBS | SYSTOLIC BLOOD PRESSURE: 151 MMHG | BODY MASS INDEX: 30.18 KG/M2

## 2023-08-16 DIAGNOSIS — M47.816 LUMBAR SPONDYLOSIS: ICD-10-CM

## 2023-08-16 DIAGNOSIS — M54.42 ACUTE LEFT-SIDED LOW BACK PAIN WITH LEFT-SIDED SCIATICA: Primary | ICD-10-CM

## 2023-08-16 DIAGNOSIS — M79.18 MYOFASCIAL PAIN SYNDROME: ICD-10-CM

## 2023-08-16 DIAGNOSIS — M51.36 LUMBAR DEGENERATIVE DISC DISEASE: ICD-10-CM

## 2023-08-16 PROCEDURE — 99204 OFFICE O/P NEW MOD 45 MIN: CPT | Performed by: ANESTHESIOLOGY

## 2023-08-16 RX ORDER — METHOCARBAMOL 750 MG/1
750 TABLET, FILM COATED ORAL 2 TIMES DAILY PRN
Qty: 60 TABLET | Refills: 0 | Status: SHIPPED | OUTPATIENT
Start: 2023-08-16 | End: 2023-11-14

## 2023-08-16 NOTE — PATIENT INSTRUCTIONS
Core Strengthening Exercises   AMBULATORY CARE:   What you need to know about core strengthening exercises: Your core includes the muscles of your lower back, hip, pelvis, and abdomen. Core strengthening exercises help heal and strengthen these muscles. This helps prevent another injury, and keeps your pelvis, spine, and hips in the correct position. Call your doctor or physical therapist if:   You have sharp or worsening pain during exercise or at rest.    You have questions or concerns about your shoulder exercises. Safety tips:  Talk to your healthcare provider before you start an exercise program. A physical therapist can teach you how to do core strengthening exercises safely. Do the exercises on a mat or firm surface. A firm surface will support your spine and prevent low back pain. Do not do these exercises on a bed. Move slowly and smoothly. Avoid fast or jerky motions. Stop if you feel pain. You may feel some discomfort at first, but you should not feel pain. Tell your provider or physical therapist if you have pain while you exercise. Regular exercise will help decrease your discomfort over time. Breathe normally during core exercises. Do not hold your breath. This may cause an increase in blood pressure and prevent muscle strengthening. Your healthcare provider will tell you when to inhale and exhale during the exercise. Begin all of your exercises with abdominal bracing. Abdominal bracing helps warm up your core muscles. You can also practice abdominal bracing throughout the day. Lie on your back with your knees bent and feet flat on the floor. Place your arms in a relaxed position beside your body. Tighten your abdominal muscles. Pull your belly button in and up toward your spine. Hold for 5 seconds. Relax your muscles. Repeat 10 times. Core strengthening exercises: Your healthcare provider will tell you how often to do these exercises.  The provider will also tell you how many repetitions of each exercise you should do. Hold each exercise for 5 seconds or as directed. As you get stronger, increase your hold to 10 to 15 seconds. You can do some of these exercises on a stability ball, or with a weight. Ask your healthcare provider how to use a stability ball or weight for these exercises:  Bridging:  Lie on your back with your knees bent and feet flat on the floor. Rest your arms at your side. Tighten your buttocks, and then lift your hips 1 inch off the floor. Hold for 5 seconds. When you can do this exercise without pain for 10 seconds, increase the distance you lift your hips. A good goal is to be able to lift your hips so that your shoulders, hips, and knees are in a straight line. Dead bug:  Lie on your back with your knees bent and feet flat on the floor. Place your arms in a relaxed position beside your body. Begin with abdominal bracing. Next, raise one leg, keeping your knee bent. Hold for 5 seconds. Repeat with the other leg. When you can do this exercise without pain for 10 to 15 seconds, you may raise one straight leg and hold. Repeat with the other leg. Quadruped:  Place your hands and knees on the floor. Keep your wrists directly below your shoulders and your knees directly below your hips. Pull your belly button in toward your spine. Do not flatten or arch your back. Tighten your abdominal muscles below your belly button. Hold for 5 seconds. When you can do this exercise without pain for 10 to 15 seconds, you may extend one arm and hold. Repeat on the other side. Side bridge exercises:      Standing side bridge:  Stand next to a wall and extend one arm toward the wall. Place your palm flat on the wall with your fingers pointing upward. Begin with abdominal bracing. Next, without moving your feet, slowly bend your arm to 90 degrees. Hold for 5 seconds. Repeat on the other side.  When you can do this exercise without pain for 10 to 15 seconds, you may do the bent leg side bridge on the floor. Bent leg side bridge:  Lie on one side with your legs, hips, and shoulders in a straight line. Prop yourself up onto your forearm so your elbow is directly below your shoulder. Bend your knees back to 90 degrees. Begin with abdominal bracing. Next, lift your hips and balance yourself on your forearm and knees. Hold for 5 seconds. Repeat on the other side. When you can do this exercise without pain for 10 to 15 seconds, you may do the straight leg side bridge on the floor. Straight leg side bridge:  Lie on one side with your legs, hips, and shoulders in a straight line. Prop yourself up onto your forearm so your elbow is directly below your shoulder. Begin with abdominal bracing. Lift your hips off the floor and balance yourself on your forearm and the outside of your flexed foot. Do not let your ankle bend sideways. Hold for 5 seconds. Repeat on the other side. When you can do this exercise without pain for 10 to 15 seconds, ask your healthcare provider for more advanced exercises. Superman:  Lie on your stomach. Extend your arms forward on the floor. Tighten your abdominal muscles and lift your right hand and left leg off the floor. Hold this position. Slowly return to the starting position. Tighten your abdominal muscles and lift your left hand and right leg off the floor. Hold this position. Slowly return to the starting position. Clam:  Lie on your side with your knees bent. Put your bottom arm under your head to keep your neck in line. Put your top hand on your hip to keep your pelvis from moving. Put your heels together, and keep them together during this exercise. Slowly raise your top knee toward the ceiling. Then lower your leg so your knees are together. Repeat this exercise 10 times. Then switch sides and do the exercise 10 times with the other leg.          Curl up:  Lie on your back with your knees bent and feet flat on the floor. Place your hands, palms down, underneath your lower back. Next, with your elbows on the floor, lift your shoulders and chest 2 to 3 inches off the floor. Keep your head in line with your shoulders. Hold this position. Slowly return to the starting position. Straight leg raises:  Lie on your back with one leg straight. Bend the other knee and place your foot flat on the floor. Tighten your abdominal muscles. Keep your leg straight and slowly lift it straight up 6 to 12 inches off the floor. Hold this position. Lower your leg slowly. Do as many repetitions as directed on this side. Repeat with the other leg. Plank:  Lie on your stomach. Bend your elbows and place your forearms flat on the floor. Lift your chest, stomach, and knees off the floor. Make sure your elbows are below your shoulders. Your body should be in a straight line. Do not let your hips or lower back sink to the ground. Squeeze your abdominal muscles together and hold for 15 seconds. To make this exercise harder, hold for 30 seconds or lift 1 leg at a time. Bicycles:  Lie on your back. Bend both knees and bring them toward your chest. Your calves should be parallel to the floor. Place the palms of your hands on the back of your head. Straighten your right leg and keep it lifted 2 inches off the floor. Raise your head and shoulders off the floor and twist towards your left. Keep your head and shoulders lifted. Bend your right knee while you straighten your left leg. Keep your left leg 2 inches off the floor. Twist your head and chest towards the left leg. Continue to straighten 1 leg at a time and twist.       Follow up with your doctor or physical therapist as directed:  Write down your questions so you remember to ask them during your visits. © Copyright Christelle Montemayor 2022 Information is for End User's use only and may not be sold, redistributed or otherwise used for commercial purposes.   The above information is an  only. It is not intended as medical advice for individual conditions or treatments. Talk to your doctor, nurse or pharmacist before following any medical regimen to see if it is safe and effective for you.

## 2023-08-16 NOTE — PROGRESS NOTES
Assessment  1. Acute left-sided low back pain with left-sided sciatica    2. Lumbar degenerative disc disease    3. Myofascial pain syndrome    4. Lumbar spondylosis        Plan  The patient symptoms, history/physical are consistent with pain that is multifactorial in origin but predominate the results of underlying lumbar spondylosis, disc protrusion and muscle spasms. I suspect that when he has any acute pain he is experiencing acute muscle spasm. At this time, I will prescribe him methocarbamol 750 mg to take during an episode twice a day as needed. He was advised that if he does have another episode, we will expedite an appointment for him for reevaluation at that time. I did explain that the role of intervention such as an epidural steroid injection is indicated when there is an acute inflammation but not when he is asymptomatic. He verbalized understanding. My impressions and treatment recommendations were discussed in detail with the patient who verbalized understanding and had no further questions. Discharge instructions were provided. I personally saw and examined the patient and I agree with the above discussed plan of care. No orders of the defined types were placed in this encounter. New Medications Ordered This Visit   Medications   • methocarbamol (ROBAXIN) 750 mg tablet     Sig: Take 1 tablet (750 mg total) by mouth 2 (two) times a day as needed for muscle spasms     Dispense:  60 tablet     Refill:  0       History of Present Illness    Nuris Bardales is a 69 y.o. male referred by Dr. Lilly Moctezuma for lower back and left-sided hip pain that has been present for 2 years. He reports intermittent left low back pain rating to the hip that occurs with certain twisting motions. Symptoms are felt intermittently and when they occur are rated 10/10 on numeric pain scale. Episodes can last several days. Pain is usually sharp, shooting and cutting.   He will rest and use anti-inflammatories which will help and then he will feel back to normal.  Symptoms are aggravated with twisting and sitting. Treatment history has included heat/ice which is helpful. Ibuprofen is helpful. I have personally reviewed and/or updated the patient's past medical history, past surgical history, family history, social history, current medications, allergies, and vital signs today. Review of Systems   Constitutional: Negative for fever and unexpected weight change. HENT: Negative for trouble swallowing. Eyes: Negative for visual disturbance. Respiratory: Negative for shortness of breath and wheezing. Cardiovascular: Negative for chest pain and palpitations. Gastrointestinal: Negative for constipation, diarrhea, nausea and vomiting. Endocrine: Negative for cold intolerance, heat intolerance and polydipsia. Genitourinary: Negative for difficulty urinating and frequency. Musculoskeletal: Positive for back pain, gait problem and joint swelling. Negative for arthralgias and myalgias. Skin: Negative for rash. Neurological: Negative for dizziness, seizures, syncope, weakness and headaches. Hematological: Does not bruise/bleed easily. Psychiatric/Behavioral: Negative for dysphoric mood. All other systems reviewed and are negative. Patient Active Problem List   Diagnosis   • Benign essential hypertension   • Hyperlipidemia   • Atherosclerosis of coronary artery bypass graft of native heart without angina pectoris       No past medical history on file. No past surgical history on file. No family history on file.     Social History     Occupational History   • Not on file   Tobacco Use   • Smoking status: Never   • Smokeless tobacco: Never   Substance and Sexual Activity   • Alcohol use: Not on file   • Drug use: Not on file   • Sexual activity: Not on file       Current Outpatient Medications on File Prior to Visit   Medication Sig   • aspirin (ECOTRIN LOW STRENGTH) 81 mg EC tablet Take 81 mg by mouth daily   • Cholecalciferol 50 MCG (2000 UT) CAPS Take 5,000 capsules by mouth   • Cinnamon 500 MG capsule Take 1,000 mg by mouth daily   • glucosamine-chondroitin 500-400 MG tablet Take 1 tablet by mouth 2 (two) times a day As Needed   • metoprolol succinate (TOPROL-XL) 50 mg 24 hr tablet    • Milk Thistle 150 MG CAPS Take 1 tablet by mouth daily   • rosuvastatin (CRESTOR) 20 MG tablet TAKE ONE TABLET BY MOUTH EVERY DAY - PATIENT NEEDS FOLLOW-UP WITH LABS   • Turmeric 1053 MG TABS Use   • valsartan (DIOVAN) 80 mg tablet Take 80 mg by mouth daily     No current facility-administered medications on file prior to visit. No Known Allergies    Physical Exam    /88   Pulse (!) 53   Wt 84.8 kg (187 lb)   BMI 30.18 kg/m²     Constitutional: normal, well developed, well nourished, alert, in no distress and non-toxic and no overt pain behavior.   Eyes: anicteric  HEENT: grossly intact  Neck: supple, symmetric, trachea midline and no masses   Pulmonary:even and unlabored  Cardiovascular:No edema or pitting edema present  Skin:Normal without rashes or lesions and well hydrated  Psychiatric:Mood and affect appropriate  Neurologic:Cranial Nerves II-XII grossly intact  Musculoskeletal:normal     Lumbar Spine Exam  Appearance:  Normal lordosis  Palpation/Tenderness:  no tenderness or spasm  Range of Motion:  Flexion:  Minimally limited  without pain  Extension:  Minimally limited  without pain  Motor Strength:  Left hip flexion:  5/5  Left hip extension:  5/5  Right hip flexion:  5/5  Right hip extension:  5/5  Left knee flexion:  5/5  Left knee extension:  5/5  Right knee flexion:  5/5  Right knee extension:  5/5  Left foot dorsiflexion:  5/5  Left foot plantar flexion:  5/5  Right foot dorsiflexion:  5/5  Right foot plantar flexion:  5/5  Reflexes:  Left Patellar:  1+   Right Patellar:  1+   Left Achilles:  1+   Right Achilles:  1+       Imaging    MRI Lumbar Spine (8/14/2023)    MRI LUMBAR SPINE WITHOUT CONTRAST     INDICATION: M54.42: Lumbago with sciatica, left side  M51.36: Other intervertebral disc degeneration, lumbar region  M47.816: Spondylosis without myelopathy or radiculopathy, lumbar region.     COMPARISON: Lumbar radiographs 6/19/2023     TECHNIQUE:  Multiplanar, multisequence imaging of the lumbar spine was performed. .        IMAGE QUALITY:  Diagnostic     FINDINGS:     VERTEBRAL BODIES:  There are 5 lumbar type vertebral bodies. Normal alignment of the lumbar spine. No spondylolysis or spondylolisthesis. No scoliosis. No compression fracture. Normal marrow signal is identified within the visualized bony   structures. No discrete marrow lesion.     SACRUM:  Normal signal within the sacrum. No evidence of insufficiency or stress fracture.     DISTAL CORD AND CONUS:  Normal size and signal within the distal cord and conus.     PARASPINAL SOFT TISSUES: Minimal left paraspinal edema is noted at the L3 level.     LOWER THORACIC DISC SPACES:  Normal disc height and signal.  No disc herniation, canal stenosis or foraminal narrowing.     LUMBAR DISC SPACES:     L1-2: No focal disc herniation, central canal stenosis, or neural foraminal narrowing.     L2-3: No focal disc herniation, central canal stenosis, or neural foraminal narrowing.     L3-4: Mild diffuse disc bulge extending into the foraminal regions. Bilateral ligamentum flavum and facet hypertrophy. No central canal or  subarticular/lateral recess narrowing. No neural foraminal stenosis.     L4-5: Mild diffuse disc bulge with bilateral ligamentum flavum and facet hypertrophy. Mild central canal and bilateral subarticular/lateral recess narrowing. Mild right neural foraminal stenosis.     L5-S1: Mild disc bulge with a superimposed small broad-based central disc protrusion. No central canal cyst. Mild bilateral subarticular/lateral recess narrowing. No neural foraminal stenosis.         XR LUMBAR SPINE     INDICATION:   W01. 0XXA: Fall on same level from slipping, tripping and stumbling without subsequent striking against object, initial encounter  M54.42: Lumbago with sciatica, left side.     COMPARISON:  None     VIEWS:  XR SPINE LUMBAR 2 OR 3 VIEWS INJURY  Images: 3     FINDINGS:     There are 5 non rib bearing lumbar vertebral bodies.     There is no evidence of acute fracture or destructive osseous lesion.     Alignment is unremarkable.     Endplate osteophytes present at all levels.     The pedicles appear intact. Facet arthropathy at L4-5, L5-S1.     Vascular calcifications present.     IMPRESSION:     Multilevel degenerative changes of lumbar spine.       LEFT HIP     INDICATION:   W01. 0XXA: Fall on same level from slipping, tripping and stumbling without subsequent striking against object, initial encounter  M54.42: Lumbago with sciatica, left side.     COMPARISON:  None     VIEWS:  XR HIP/PELV 2-3 VWS LEFT  W PELVIS IF PERFORMED  Images: 4     FINDINGS:     There is no acute fracture or dislocation.     Mild left hip osteoarthritis is seen.     No lytic or blastic osseous lesion.     Soft tissues are unremarkable.     Degenerative changes visualized lower lumbar spine.     IMPRESSION:     Mild degenerative changes of the left hip.

## 2023-08-18 PROBLEM — I25.810 ATHEROSCLEROSIS OF CORONARY ARTERY BYPASS GRAFT OF NATIVE HEART WITHOUT ANGINA PECTORIS: Status: ACTIVE | Noted: 2023-04-26

## 2024-02-22 PROBLEM — F51.04 PSYCHOPHYSIOLOGICAL INSOMNIA: Status: ACTIVE | Noted: 2024-02-22

## 2024-02-22 PROBLEM — G25.81 RESTLESS LEG: Status: ACTIVE | Noted: 2024-02-22

## 2024-02-22 PROBLEM — G47.19 EXCESSIVE DAYTIME SLEEPINESS: Status: ACTIVE | Noted: 2024-02-22

## 2024-02-22 PROBLEM — G47.19 EXCESSIVE DAYTIME SLEEPINESS: Status: RESOLVED | Noted: 2024-02-22 | Resolved: 2024-02-22

## 2024-03-25 ENCOUNTER — OFFICE VISIT (OUTPATIENT)
Dept: PHYSICAL THERAPY | Facility: CLINIC | Age: 69
End: 2024-03-25
Payer: MEDICARE

## 2024-03-25 DIAGNOSIS — G89.29 CHRONIC RIGHT SHOULDER PAIN: Primary | ICD-10-CM

## 2024-03-25 DIAGNOSIS — M25.511 CHRONIC RIGHT SHOULDER PAIN: Primary | ICD-10-CM

## 2024-03-25 PROCEDURE — 97140 MANUAL THERAPY 1/> REGIONS: CPT | Performed by: PHYSICAL THERAPIST

## 2024-03-25 PROCEDURE — 97161 PT EVAL LOW COMPLEX 20 MIN: CPT | Performed by: PHYSICAL THERAPIST

## 2024-03-25 NOTE — PROGRESS NOTES
PT EVALUATION     Today's date: 23  Patient name: Sharath Mckeon  : 1955  MRN: [id number]  Referring provider: Marcelino Keith, *  Dx:   1. Chronic pain of right shoulder          Sharath Mckeon is a 67 y.o. male who presents with right shoulder pain following increased amount of lifting activities towards the end of .  Pain is throbbing and there is decreased range of motion, atrophy at the supraspinatus and TYPE 1 scapular malpositioning.  Symptoms tend to be anterior and down the biceps.  There is tightness with internal rotation and pain with resisted ER rotation. This patient would benefit from skilled physical therapy to address their listed impairments and functional limitations to maximize functional outcome.     Impairments:    restricted ROM    decreased strength   pain with function   activity intolerance   Postural dysfunction      Prognosis:  Excellent  Positive and negative prognostic indicator(s):  pain >3 months     Goals:    STG Patient is independent with HEP   STG Range of motion is improved by 25% in 2 weeks  LTG Range of motion is improved by 50% in 4 weeks  LTG Increase strength full grade in 4 weeks     Planned interventions:  home exercise program, patient education, manual therapy, graded activity, flexibility and functional range of motion exercises     Duration in visits:  8  Frequency: 2 visits per week  Duration in weeks:  4-6     History of Current Injury:  Worst time is lying down at night.  The best position is prone with arm flexed overhead or supine with the arm abducted and externally rotated.   He started lifting more objects towards the end of  and noticed more throbbing.  Prior right shoulder pain was treated last April and he did not have pain during the summer.      Pain location: anterior shoulder  Pain descriptors:  throbbing   Pain intensity:  8/10, 0/10 at rest in certain positions (arm supported)     Aggravating factors:  lifting heavier  objects, lifting away from body  Easing factors: avoiding above        Patient goals:  decreased pain, increased mobility and increased strength     Objective      Active Range of Motion   Left Shoulder   Flexion: 155 degrees   Extension: 50 degrees   Abduction: 155 degrees   Internal rotation BTB: T10     Right Shoulder   Flexion: 145 (from 158 degrees)  Extension: 50 degrees   Abduction: 125 (from 159 degrees)   Internal rotation BTB: T11     Strength/Myotome Testing     Left Shoulder      Planes of Motion   Flexion: 4+   Extension: 4   External rotation at 0°: 4   Internal rotation at 0°: 5     Additional Strength Details  Pain with resisted ER     General Comments:        Shoulder Comments   Type 1 SICK scapula R>L  Protracted scapulae      Daily Note     Today's date: 3/25/2024  Patient name: Sharath Mckeon  : 1955  MRN: [id number]  Referring provider: Marcelino Keith, *  Dx:   Encounter Diagnosis     ICD-10-CM    1. Chronic right shoulder pain  M25.511     G89.29                       Precautions: none       Manuals                                                                 Neuro Re-Ed                                                                                                        Ther Ex             rows Yellow :10x10            ER iso band Yellow :10x5, right static            Thoracic ext over chair :10x10                                                                             Ther Activity                                                                              Pt Edu                                        Modalities

## 2024-04-10 ENCOUNTER — APPOINTMENT (OUTPATIENT)
Dept: RADIOLOGY | Facility: OTHER | Age: 69
End: 2024-04-10
Payer: MEDICARE

## 2024-04-10 ENCOUNTER — OFFICE VISIT (OUTPATIENT)
Dept: OBGYN CLINIC | Facility: OTHER | Age: 69
End: 2024-04-10
Payer: MEDICARE

## 2024-04-10 VITALS
HEIGHT: 66 IN | HEART RATE: 67 BPM | WEIGHT: 186.8 LBS | DIASTOLIC BLOOD PRESSURE: 76 MMHG | SYSTOLIC BLOOD PRESSURE: 127 MMHG | BODY MASS INDEX: 30.02 KG/M2

## 2024-04-10 DIAGNOSIS — M25.511 RIGHT SHOULDER PAIN, UNSPECIFIED CHRONICITY: ICD-10-CM

## 2024-04-10 DIAGNOSIS — M24.811 INTERNAL DERANGEMENT OF RIGHT SHOULDER: Primary | ICD-10-CM

## 2024-04-10 PROCEDURE — 99214 OFFICE O/P EST MOD 30 MIN: CPT | Performed by: ORTHOPAEDIC SURGERY

## 2024-04-10 PROCEDURE — 73030 X-RAY EXAM OF SHOULDER: CPT

## 2024-04-10 RX ORDER — MELOXICAM 15 MG/1
15 TABLET ORAL DAILY
Qty: 30 TABLET | Refills: 0 | Status: SHIPPED | OUTPATIENT
Start: 2024-04-10

## 2024-04-10 NOTE — PROGRESS NOTES
"Orthopaedic Surgery - Office Note  Sharath Mckeon (68 y.o. male)   : 1955   MRN: [id number]  Encounter Date: 4/10/2024    Assessment / Plan    Right shoulder concern for RTC tear    Patient has an examination worrisome for rotator cuff pathology, given their lack of improvement with physical therapy and home exercise program.  He is indicated at this time for an MRI scan to evaluate for surgical pathology.    We will review the results of the study when it has been obtained and discuss further treatment options.    Follow-up:  Return for discussion of diagnostic studies.      Chief Complaint / Date of Onset  Right shoulder pain  Injury Mechanism / Date  2022 throwing a ball to his dog   lifting something heavy  Surgery / Date  None    History of Present Illness   Sharath Mckeon is a 68 y.o. male who presents for follow up of right shoulder pain.  Patient was last seen 5/10/23 at which time he wanted to continue physical therapy.  Patient reports he was lifting something heavy the end of 2024. Symptoms wake him from sleep a night.       Treatment Summary  Medications / Modalities  Tylenol no relief  Bracing / Immobilization  None  Physical Therapy  2 visits l2023  1 visit 3/25/24  Injections  None  Prior Surgeries  None  Other Treatments  None    Employment / Current Status  Self employed / [employer]     Sport / Organization / Current Status  N/A      Review of Systems  Pertinent items are noted in HPI.  All other systems were reviewed and are negative.      Physical Exam  /76   Pulse 67   Ht 5' 6\" (1.676 m)   Wt 84.7 kg (186 lb 12.8 oz)   BMI 30.15 kg/m²   Cons: Appears well.  No apparent distress.  Psych: Alert. Oriented x3.  Mood and affect normal.  Eyes: PERRLA, EOMI  Resp: Normal effort.  No audible wheezing or stridor.  CV: Palpable pulse.  No discernable arrhythmia.  No LE edema.  Lymph:  No palpable cervical, axillary, or inguinal lymphadenopathy.  Skin: Warm.  No palpable " masses.  No visible lesions.  Neuro: Normal muscle tone.  Normal and symmetric DTR's.     Right Shoulder Exam  Alignment / Posture:  Normal shoulder posture.  Inspection:  No swelling. No edema. No erythema. No ecchymosis. No muscle atrophy.  Palpation:  No tenderness.  ROM:  Shoulder . Shoulder ER 60. Shoulder IR T10.  Strength:  Supraspinatus 4-/5. Infraspinatus 4-/5.Subscapularis 5/5  Stability:  No objective shoulder instability.  Tests: (+) Neer. (+) [place]. (-) Walton. (-) Speed.  Neurovascular:  Sensation intact in Ax/R/M/U nerve distributions. 2+ radial pulse.       Studies Reviewed  XR of right shoulder - no fracture or dislocation       Procedures  No procedures today.    Medical, Surgical, Family, and Social History  The patient's medical history, family history, and social history, were reviewed and updated as appropriate.    Past Medical History:   Diagnosis Date    High cholesterol     Hypertension        Past Surgical History:   Procedure Laterality Date    CORONARY ARTERY BYPASS GRAFT         Family History   Problem Relation Age of Onset    Cancer Father        Social History     Occupational History    Not on file   Tobacco Use    Smoking status: Former     Types: Cigarettes    Smokeless tobacco: Never   Vaping Use    Vaping status: Never Used   Substance and Sexual Activity    Alcohol use: Not on file    Drug use: Not on file    Sexual activity: Not on file       No Known Allergies      Current Outpatient Medications:     aspirin (ECOTRIN LOW STRENGTH) 81 mg EC tablet, Take 81 mg by mouth daily, Disp: , Rfl:     Cholecalciferol 50 MCG (2000 UT) CAPS, Take 5,000 capsules by mouth, Disp: , Rfl:     Cinnamon 500 MG capsule, Take 1,000 mg by mouth daily, Disp: , Rfl:     metoprolol succinate (TOPROL-XL) 50 mg 24 hr tablet, , Disp: , Rfl:     Milk Thistle 150 MG CAPS, Take 1 tablet by mouth daily, Disp: , Rfl:     rosuvastatin (CRESTOR) 20 MG tablet, TAKE ONE TABLET BY MOUTH EVERY DAY - PATIENT  NEEDS FOLLOW-UP WITH LABS, Disp: , Rfl:     Turmeric 1053 MG TABS, Use, Disp: , Rfl:     glucosamine-chondroitin 500-400 MG tablet, Take 1 tablet by mouth 2 (two) times a day As Needed (Patient not taking: Reported on 2/22/2024), Disp: , Rfl:     valsartan (DIOVAN) 80 mg tablet, Take 80 mg by mouth daily, Disp: , Rfl:       Jocy Kelley    Scribe Attestation      I,:  Jocy Kelley am acting as a scribe while in the presence of the attending physician.:       I,:  Marcelino Keith MD personally performed the services described in this documentation    as scribed in my presence.:

## 2024-04-23 ENCOUNTER — HOSPITAL ENCOUNTER (OUTPATIENT)
Dept: RADIOLOGY | Age: 69
Discharge: HOME | End: 2024-04-23
Payer: MEDICARE

## 2024-04-23 DIAGNOSIS — M24.811 INTERNAL DERANGEMENT OF RIGHT SHOULDER: ICD-10-CM

## 2024-04-23 PROCEDURE — 73221 MRI JOINT UPR EXTREM W/O DYE: CPT

## 2024-05-08 ENCOUNTER — OFFICE VISIT (OUTPATIENT)
Dept: OBGYN CLINIC | Facility: OTHER | Age: 69
End: 2024-05-08
Payer: MEDICARE

## 2024-05-08 VITALS
WEIGHT: 186 LBS | HEART RATE: 53 BPM | DIASTOLIC BLOOD PRESSURE: 70 MMHG | BODY MASS INDEX: 29.89 KG/M2 | HEIGHT: 66 IN | SYSTOLIC BLOOD PRESSURE: 116 MMHG

## 2024-05-08 DIAGNOSIS — M75.111 INCOMPLETE TEAR OF RIGHT ROTATOR CUFF, UNSPECIFIED WHETHER TRAUMATIC: Primary | ICD-10-CM

## 2024-05-08 PROCEDURE — 99214 OFFICE O/P EST MOD 30 MIN: CPT | Performed by: ORTHOPAEDIC SURGERY

## 2024-05-08 PROCEDURE — 20610 DRAIN/INJ JOINT/BURSA W/O US: CPT | Performed by: ORTHOPAEDIC SURGERY

## 2024-05-08 RX ORDER — BUPIVACAINE HYDROCHLORIDE 2.5 MG/ML
4 INJECTION, SOLUTION INFILTRATION; PERINEURAL
Status: COMPLETED | OUTPATIENT
Start: 2024-05-08 | End: 2024-05-08

## 2024-05-08 RX ORDER — METHYLPREDNISOLONE ACETATE 40 MG/ML
1 INJECTION, SUSPENSION INTRA-ARTICULAR; INTRALESIONAL; INTRAMUSCULAR; SOFT TISSUE
Status: COMPLETED | OUTPATIENT
Start: 2024-05-08 | End: 2024-05-08

## 2024-05-08 RX ADMIN — METHYLPREDNISOLONE ACETATE 1 ML: 40 INJECTION, SUSPENSION INTRA-ARTICULAR; INTRALESIONAL; INTRAMUSCULAR; SOFT TISSUE at 12:00

## 2024-05-08 RX ADMIN — BUPIVACAINE HYDROCHLORIDE 4 ML: 2.5 INJECTION, SOLUTION INFILTRATION; PERINEURAL at 12:00

## 2024-05-08 NOTE — PROGRESS NOTES
"Orthopaedic Surgery - Office Note  Sharath Mckeon (68 y.o. male)   : 1955   MRN: [id number]  Encounter Date: 2024    Chief Complaint   Patient presents with    Right Shoulder - Follow-up       Assessment / Plan  Right shoulder partial thickness articular sided rotator cuff tear    May consider surgical intervention if no improvement following CSI  CSI of right subacromial bursa  was performed  Activity as tolerated  Home exercise program reviewed  Continue outpatient PT  Anti-inflammatories or Tylenol prn pain  Return in about 3 months (around 2024) for Recheck with Dr. Keith.    History of Present Illness  Sharath Mckeon is a 68 y.o. male who presents today for follow-up evaluation right shoulder pain.  He states that he does continue to have dull achy pain along the anterior lateral aspect of his shoulder which he rates a 3 of 10.  He states that his pain is predominantly with repetitive overhead lifting.  He does occasionally also have some stiffness early in the morning or after long-term use, however later in the day does not seem to improve.  He did pause outpatient physical therapy due to getting his MRI, but since has been compliant with a home exercise program.  He is leaving for [employer] in a few days.  He does have some interest in surgical intervention after seeing his MRI report, however he wishes to hold off until January.  He denies any new injury or trauma to his shoulder.  He denies any numbness or tingling.    Review of Systems  Pertinent items are noted in HPI.  All other systems were reviewed and are negative.    Physical Exam  /70   Pulse (!) 53   Ht 5' 6\" (1.676 m)   Wt 84.4 kg (186 lb)   BMI 30.02 kg/m²   Cons: Appears well.  No apparent distress.  Psych: Alert. Oriented x3.  Mood and affect normal.  Eyes: PERRLA, EOMI  Resp: Normal effort.  No audible wheezing or stridor.  CV: Palpable pulse.  No discernable arrhythmia.  No LE edema.  Lymph:  No palpable " "cervical, axillary, or inguinal lymphadenopathy.  Skin: Warm.  No palpable masses.  No visible lesions.  Neuro: Normal muscle tone.  Normal and symmetric DTR's.     Right Shoulder Exam  Alignment / Posture:  Normal shoulder posture.  Inspection:  No swelling. No edema. No erythema. No ecchymosis. No muscle atrophy.  Palpation:   Subacromial tenderness. No effusion. Mild crepitus.  ROM:  Shoulder . Shoulder ER 60. Shoulder IR T10.  Strength:  Supraspinatus 4+/5. Infraspinatus 4+/5. Subscapularis 4+/5.  Stability:  No objective shoulder instability.  Tests: (+) [place]. (+) Neer.  Neurovascular:  Sensation intact in Ax/R/M/U nerve distributions. 2+ radial pulse.     Studies Reviewed  I have personally reviewed pertinent films in PACS.  MRI of right shoulder - performed on 4/23/2024 which demonstrates partial-thickness articular surface tearing of supraspinatus tendon involving approximately 50% of fibers.  Insertional tendinosis of supraspinatus, infraspinatus, and subscapularis.  Mild long head biceps tendinosis without tearing    Large joint arthrocentesis: R subacromial bursa  Universal Protocol:  Consent: Verbal consent obtained.  Risks and benefits: risks, benefits and alternatives were discussed  Consent given by: patient  Time out: Immediately prior to procedure a \"time out\" was called to verify the correct patient, procedure, equipment, support staff and site/side marked as required.  Timeout called at: 5/8/2024 12:33 PM.  Patient understanding: patient states understanding of the procedure being performed  Patient consent: the patient's understanding of the procedure matches consent given  Site marked: the operative site was marked  Patient identity confirmed: verbally with patient  Supporting Documentation  Indications: pain and diagnostic evaluation   Procedure Details  Location: shoulder - R subacromial bursa  Needle size: 22 G  Ultrasound guidance: no  Approach: anterolateral  Medications " administered: 4 mL bupivacaine 0.25 %; 1 mL methylPREDNISolone acetate 40 mg/mL    Patient tolerance: patient tolerated the procedure well with no immediate complications  Dressing:  Sterile dressing applied             Medical, Surgical, Family, and Social History  The patient's medical history, family history, and social history, were reviewed and updated as appropriate.    Past Medical History:   Diagnosis Date    High cholesterol     Hypertension        Past Surgical History:   Procedure Laterality Date    CORONARY ARTERY BYPASS GRAFT         Family History   Problem Relation Age of Onset    Cancer Father        Social History     Occupational History    Not on file   Tobacco Use    Smoking status: Former     Types: Cigarettes    Smokeless tobacco: Never   Vaping Use    Vaping status: Never Used   Substance and Sexual Activity    Alcohol use: Not on file    Drug use: Not on file    Sexual activity: Not on file       No Known Allergies      Current Outpatient Medications:     Cholecalciferol 50 MCG (2000 UT) CAPS, Take 5,000 capsules by mouth, Disp: , Rfl:     Cinnamon 500 MG capsule, Take 1,000 mg by mouth daily, Disp: , Rfl:     metoprolol succinate (TOPROL-XL) 50 mg 24 hr tablet, , Disp: , Rfl:     Milk Thistle 150 MG CAPS, Take 1 tablet by mouth daily, Disp: , Rfl:     rosuvastatin (CRESTOR) 20 MG tablet, TAKE ONE TABLET BY MOUTH EVERY DAY - PATIENT NEEDS FOLLOW-UP WITH LABS, Disp: , Rfl:     Turmeric 1053 MG TABS, Use, Disp: , Rfl:     aspirin (ECOTRIN LOW STRENGTH) 81 mg EC tablet, Take 81 mg by mouth daily, Disp: , Rfl:     glucosamine-chondroitin 500-400 MG tablet, Take 1 tablet by mouth 2 (two) times a day As Needed (Patient not taking: Reported on 2/22/2024), Disp: , Rfl:     meloxicam (Mobic) 15 mg tablet, Take 1 tablet (15 mg total) by mouth daily, Disp: 30 tablet, Rfl: 0    valsartan (DIOVAN) 80 mg tablet, Take 80 mg by mouth daily, Disp: , Rfl:       Faheem Jolly Attestation      I,:   Faheem Hinojosa am acting as a scribe while in the presence of the attending physician.:       I,:  Marcelino Keith MD personally performed the services described in this documentation    as scribed in my presence.:

## 2024-07-31 ENCOUNTER — OFFICE VISIT (OUTPATIENT)
Dept: OBGYN CLINIC | Facility: OTHER | Age: 69
End: 2024-07-31
Payer: MEDICARE

## 2024-07-31 VITALS
SYSTOLIC BLOOD PRESSURE: 115 MMHG | HEART RATE: 57 BPM | WEIGHT: 183 LBS | DIASTOLIC BLOOD PRESSURE: 72 MMHG | BODY MASS INDEX: 29.41 KG/M2 | HEIGHT: 66 IN

## 2024-07-31 DIAGNOSIS — M75.111 INCOMPLETE TEAR OF RIGHT ROTATOR CUFF, UNSPECIFIED WHETHER TRAUMATIC: Primary | ICD-10-CM

## 2024-07-31 PROCEDURE — 20610 DRAIN/INJ JOINT/BURSA W/O US: CPT | Performed by: PHYSICIAN ASSISTANT

## 2024-07-31 PROCEDURE — 99213 OFFICE O/P EST LOW 20 MIN: CPT | Performed by: PHYSICIAN ASSISTANT

## 2024-07-31 RX ORDER — METHYLPREDNISOLONE ACETATE 40 MG/ML
1 INJECTION, SUSPENSION INTRA-ARTICULAR; INTRALESIONAL; INTRAMUSCULAR; SOFT TISSUE
Status: COMPLETED | OUTPATIENT
Start: 2024-07-31 | End: 2024-07-31

## 2024-07-31 RX ORDER — ROPIVACAINE HYDROCHLORIDE 5 MG/ML
4 INJECTION, SOLUTION EPIDURAL; INFILTRATION; PERINEURAL
Status: COMPLETED | OUTPATIENT
Start: 2024-07-31 | End: 2024-07-31

## 2024-07-31 RX ADMIN — ROPIVACAINE HYDROCHLORIDE 4 ML: 5 INJECTION, SOLUTION EPIDURAL; INFILTRATION; PERINEURAL at 11:00

## 2024-07-31 RX ADMIN — METHYLPREDNISOLONE ACETATE 1 ML: 40 INJECTION, SUSPENSION INTRA-ARTICULAR; INTRALESIONAL; INTRAMUSCULAR; SOFT TISSUE at 11:00

## 2024-07-31 NOTE — PROGRESS NOTES
Orthopaedic Surgery - Office Note  Sharath Mckeon (68 y.o. male)   : 1955   MRN: [id number]  Encounter Date: 2024    Chief Complaint   Patient presents with    Right Shoulder - Follow-up       Assessment / Plan  Right shoulder partial thickness articular sided rotator cuff tear    Patient is considering surgical intervention but not to  due to his work if the pain continues.    Discussion of risk and benefits we did proceed with a CSI of right subacromial bursa which was performed and prepared sterilely and tolerated well.  Patient did have improvement in symptoms immediately after the injection.  We did discuss that going forward we would like to space out injections at least 3 months apart and that steroid injections in the shoulder do cause a higher currents of re-tears following rotator cuff repair if he decides to pursue surgery.    Activity as tolerated  Home exercise program reviewed.  Did do patient therapy prior to the last injection that did help his symptoms and will start this on his own at this time.  Anti-inflammatories or Tylenol prn pain  Return for follow up in January with Dr. Keith.    History of Present Illness  Sharath Mckeon is a 68 y.o. male who presents today for follow-up evaluation right shoulder pain.  He states that he does continue to have dull achy pain along the anterior lateral aspect of his shoulder which he rates a 5 of 10.  He states that his pain is predominantly with repetitive overhead lifting.  He did have a steroid injection of the subacromial bursa on 2024 which she feels helped greatly up to about 2 weeks ago when he was reaching while in bed and felt another pain with recurrence of his symptoms.  Since his last visit he has not been doing his home exercise program.  He does occasionally also have some stiffness early in the morning or after long-term use, however later in the day does not seem to improve.  He did pause outpatient physical therapy  "due to getting his MRI, but since has been compliant with a home exercise program.  He does have some interest in surgical intervention after seeing his MRI report, however he wishes to hold off until January.  He denies any new injury or trauma to his shoulder.  He denies any numbness or tingling.    Review of Systems  Pertinent items are noted in HPI.  All other systems were reviewed and are negative.    Physical Exam  /72 (BP Location: Left arm, Patient Position: Sitting, Cuff Size: Adult)   Pulse 57   Ht 5' 6\" (1.676 m) Comment: verbal  Wt 83 kg (183 lb)   BMI 29.54 kg/m²   Cons: Appears well.  No apparent distress.  Psych: Alert. Oriented x3.  Mood and affect normal.  Eyes: PERRLA, EOMI  Resp: Normal effort.  No audible wheezing or stridor.  CV: Palpable pulse.  No discernable arrhythmia.  No LE edema.  Lymph:  No palpable cervical, axillary, or inguinal lymphadenopathy.  Skin: Warm.  No palpable masses.  No visible lesions.  Neuro: Normal muscle tone.  Normal and symmetric DTR's.     Right Shoulder Exam  Alignment / Posture:  Normal shoulder posture.  Inspection:  No swelling. No edema. No erythema. No ecchymosis. No muscle atrophy.  Palpation:   Subacromial tenderness. No effusion. Mild crepitus.  ROM:  Shoulder . Shoulder ER 60. Shoulder IR T10.  Strength:  Supraspinatus 4+/5. Infraspinatus 4+/5. Subscapularis 4+/5.  Stability:  No objective shoulder instability.  Tests: (+) Walton. (+) Neer.  Neurovascular:  Sensation intact in Ax/R/M/U nerve distributions. 2+ radial pulse.     Studies Reviewed  I have personally reviewed pertinent films in PACS.  MRI of right shoulder - performed on 4/23/2024 which demonstrates partial-thickness articular surface tearing of supraspinatus tendon involving approximately 50% of fibers.  Insertional tendinosis of supraspinatus, infraspinatus, and subscapularis.  Mild long head biceps tendinosis without tearing    Large joint arthrocentesis: R subacromial " "bursa  Universal Protocol:  Consent: Verbal consent obtained.  Risks and benefits: risks, benefits and alternatives were discussed  Consent given by: patient  Time out: Immediately prior to procedure a \"time out\" was called to verify the correct patient, procedure, equipment, support staff and site/side marked as required.  Timeout called at: 5/8/2024 12:33 PM.  Patient understanding: patient states understanding of the procedure being performed  Patient consent: the patient's understanding of the procedure matches consent given  Site marked: the operative site was marked  Patient identity confirmed: verbally with patient  Supporting Documentation  Indications: pain and diagnostic evaluation   Procedure Details  Location: shoulder - R subacromial bursa  Needle size: 22 G  Ultrasound guidance: no  Approach: anterolateral  Medications administered: 1 mL methylPREDNISolone acetate 40 mg/mL; 4 mL ropivacaine 0.5 %    Patient tolerance: patient tolerated the procedure well with no immediate complications  Dressing:  Sterile dressing applied             Medical, Surgical, Family, and Social History  The patient's medical history, family history, and social history, were reviewed and updated as appropriate.    Past Medical History:   Diagnosis Date    High cholesterol     Hypertension        Past Surgical History:   Procedure Laterality Date    CORONARY ARTERY BYPASS GRAFT         Family History   Problem Relation Age of Onset    Cancer Father        Social History     Occupational History    Not on file   Tobacco Use    Smoking status: Former     Types: Cigarettes    Smokeless tobacco: Never   Vaping Use    Vaping status: Never Used   Substance and Sexual Activity    Alcohol use: Not on file    Drug use: Not on file    Sexual activity: Not on file       No Known Allergies      Current Outpatient Medications:     Cholecalciferol 50 MCG (2000 UT) CAPS, Take 5,000 capsules by mouth, Disp: , Rfl:     Cinnamon 500 MG capsule, " Take 1,000 mg by mouth daily, Disp: , Rfl:     CINNAMON EXTRACT PO, 9,000 mg, Disp: , Rfl:     Coenzyme Q10 (Co Q 10) 100 MG CAPS, , Disp: , Rfl:     Magnesium 100 MG CAPS, , Disp: , Rfl:     metoprolol succinate (TOPROL-XL) 50 mg 24 hr tablet, , Disp: , Rfl:     Milk Thistle 150 MG CAPS, Take 1 tablet by mouth daily, Disp: , Rfl:     Omega-3 Fatty Acids (Fish Oil) 1200 MG CPDR, , Disp: , Rfl:     rosuvastatin (CRESTOR) 20 MG tablet, TAKE ONE TABLET BY MOUTH EVERY DAY - PATIENT NEEDS FOLLOW-UP WITH LABS, Disp: , Rfl:     Turmeric 1053 MG TABS, Use, Disp: , Rfl:     aspirin (ECOTRIN LOW STRENGTH) 81 mg EC tablet, Take 81 mg by mouth daily, Disp: , Rfl:     glucosamine-chondroitin 500-400 MG tablet, Take 1 tablet by mouth 2 (two) times a day As Needed (Patient not taking: Reported on 2/22/2024), Disp: , Rfl:     meloxicam (Mobic) 15 mg tablet, Take 1 tablet (15 mg total) by mouth daily, Disp: 30 tablet, Rfl: 0    valsartan (DIOVAN) 80 mg tablet, Take 80 mg by mouth daily, Disp: , Rfl:       Doni Felix PA-C    Scribe Attestation      I,:   am acting as a scribe while in the presence of the attending physician.:       I,:   personally performed the services described in this documentation    as scribed in my presence.: